# Patient Record
Sex: FEMALE | Race: OTHER | URBAN - METROPOLITAN AREA
[De-identification: names, ages, dates, MRNs, and addresses within clinical notes are randomized per-mention and may not be internally consistent; named-entity substitution may affect disease eponyms.]

---

## 2019-04-23 ENCOUNTER — OFFICE VISIT (OUTPATIENT)
Dept: FAMILY MEDICINE CLINIC | Age: 29
End: 2019-04-23

## 2019-04-23 VITALS
HEART RATE: 98 BPM | DIASTOLIC BLOOD PRESSURE: 82 MMHG | SYSTOLIC BLOOD PRESSURE: 118 MMHG | WEIGHT: 201.6 LBS | RESPIRATION RATE: 14 BRPM | HEIGHT: 65 IN | BODY MASS INDEX: 33.59 KG/M2 | OXYGEN SATURATION: 96 % | TEMPERATURE: 98 F

## 2019-04-23 DIAGNOSIS — Z32.01 PREGNANCY, CONFIRMED, NOT FIRST: Primary | ICD-10-CM

## 2019-04-23 DIAGNOSIS — Z32.01 PREGNANCY TEST POSITIVE: ICD-10-CM

## 2019-04-23 LAB
HCG URINE, QL. (POC): POSITIVE
VALID INTERNAL CONTROL?: YES

## 2019-04-23 NOTE — PROGRESS NOTES
Sal South is a 34 y.o.  female and presents with    Chief Complaint   Patient presents with    [facility]    Pregnancy Test     at home test is positive         Subjective:  Patient presents for pregnancy test. She is new to office and does not have GYN. No other c/o today. Current Outpatient Medications   Medication Sig Dispense Refill    prenatal 14/WPTU fum/folic/dha (PRENATAL-1 PO) Take  by mouth. No Known Allergies    Review of Systems   Genitourinary:        + missed period. Objective:  Vitals:    04/23/19 1116   BP: 118/82   Pulse: 98   Resp: 14   Temp: 98 °F (36.7 °C)   TempSrc: Oral   SpO2: 96%   Weight: 201 lb 9.6 oz (91.4 kg)   Height: 5' 4.72\" (1.644 m)   PainSc:   0 - No pain     General:   Well-groomed, well-nourished, in no distress, pleasant, alert, appropriate    Cardiovasc:   RRR,  no murmur, rubs or gallops. Pulmonary:    Lungs clear bilaterally, no wheezing, rales or rhonchi. Abdomen:   Abdomen soft, normal bowel sounds. No masses, tenderness,    rebound/rigidity or CVA tenderness. No hepatosplenomegaly. Assessment/Plan:      1. Pregnancy test positive  - AMB POC URINE PREGNANCY TEST, VISUAL COLOR COMPARISON    2. Pregnancy, confirmed, not first  Confirmed based on LMP 03/05/2019, Due date is 12/11/2019. Suggested [facility] she will call for appt. I have discussed the diagnosis with the patient and the intended plan as seen in the above orders. The patient has received an after-visit summary and questions were answered concerning future plans. I have discussed medication side effects and warnings with the patient as well. I have reviewed the plan of care with the patient, accepted their input and they are in agreement with the treatment goals. Follow-up and Dispositions    · Return in about 1 year (around 4/23/2020).        More than 1/2 of this 30 minute visit was spent in counselling and coordination of care, as described above.     Mendoza Veras FNP-BC

## 2019-04-23 NOTE — PATIENT INSTRUCTIONS
Due date 12/11/2019  6 weeks 6 days now        Learning About Pregnancy  Your Care Instructions    Your health in the early weeks of your pregnancy is particularly important for your baby's health. Take good care of yourself. Anything you do that harms your body can also harm your baby. Make sure to go to all of your doctor appointments. Regular checkups will help keep you and your baby healthy. How can you care for yourself at home? Diet    · Eat a balanced diet. Make sure your diet includes plenty of beans, peas, and leafy green vegetables.     · Do not skip meals or go for many hours without eating. If you are nauseated, try to eat a small, healthy snack every 2 to 3 hours.     · Do not eat fish that has a high level of mercury, such as shark, swordfish, or mackerel. Do not eat more than one can of tuna each week.     · Drink plenty of fluids, enough so that your urine is light yellow or clear like water. If you have kidney, heart, or liver disease and have to limit fluids, talk with your doctor before you increase the amount of fluids you drink.     · Cut down on caffeine, such as coffee, tea, and cola.     · Do not drink alcohol, such as beer, wine, or hard liquor.     · Take a multivitamin that contains at least 400 micrograms (mcg) of folic acid to help prevent birth defects. Fortified cereal and whole wheat bread are good additional sources of folic acid.     · Increase the calcium in your diet. Try to drink a quart of skim milk each day. You may also take calcium supplements and choose foods such as cheese and yogurt.    Lifestyle    · Make sure you go to your follow-up appointments.     · Get plenty of rest. You may be unusually tired while you are pregnant.     · Get at least 30 minutes of exercise on most days of the week. Walking is a good choice. If you have not exercised in the past, start out slowly. Take several short walks each day.     · Do not smoke.  If you need help quitting, talk to your doctor about stop-smoking programs. These can increase your chances of quitting for good.     · Do not touch cat feces or litter boxes. Also, wash your hands after you handle raw meat, and fully cook all meat before you eat it. Wear gloves when you work in the yard or garden, and wash your hands well when you are done. Cat feces, raw or undercooked meat, and contaminated dirt can cause an infection that may harm your baby or lead to a miscarriage.     · Do not use saunas or hot tubs. Raising your body temperature may harm your baby.     · Avoid chemical fumes, paint fumes, or poisons.     · Do not use illegal drugs or alcohol. Medicines    · Review all of your medicines with your doctor. Some of your routine medicines may need to be changed to protect your baby.     · Use acetaminophen (Tylenol) to relieve minor problems, such as a mild headache or backache or a mild fever with cold symptoms. Do not use nonsteroidal anti-inflammatory drugs (NSAIDs), such as ibuprofen (Advil, Motrin) or naproxen (Aleve), unless your doctor says it is okay.     · Do not take two or more pain medicines at the same time unless the doctor told you to. Many pain medicines have acetaminophen, which is Tylenol. Too much acetaminophen (Tylenol) can be harmful.     · Take your medicines exactly as prescribed. Call your doctor if you think you are having a problem with your medicine.    To manage morning sickness    · If you feel sick when you first wake up, try eating a small snack (such as crackers) before you get out of bed. Allow some time to digest the snack, and then get out of bed slowly.     · Do not skip meals or go for long periods without eating. An empty stomach can make nausea worse.     · Eat small, frequent meals instead of three large meals each day.     · Drink plenty of fluids.  Sports drinks, such as Gatorade or Powerade, are good choices.     · Eat foods that are high in protein but low in fat.     · If you are taking iron supplements, ask your doctor if they are necessary. Iron can make nausea worse.     · Avoid any smells, such as coffee, that make you feel sick.     · Get lots of rest. Morning sickness may be worse when you are tired. Follow-up care is a key part of your treatment and safety. Be sure to make and go to all appointments, and call your doctor if you are having problems. It's also a good idea to know your test results and keep a list of the medicines you take. Where can you learn more? Go to http://ramya-leticia.info/. Enter G722 in the search box to learn more about \"Learning About Pregnancy. \"  Current as of: September 5, 2018  Content Version: 11.9  © 4965-5426 [employer]. Care instructions adapted under license by [employer] (which disclaims liability or warranty for this information). If you have questions about a medical condition or this instruction, always ask your healthcare professional. [address] any warranty or liability for your use of this information.

## 2019-04-23 NOTE — PROGRESS NOTES
Coco Galvez is a 34 y.o. female (: 1990) presenting to address:    Chief Complaint   Patient presents with   [facility] Pregnancy Test     at home test is positive       Vitals:    19 1116   BP: 118/82   Pulse: 98   Resp: 14   Temp: 98 °F (36.7 °C)   TempSrc: Oral   SpO2: 96%   Weight: 201 lb 9.6 oz (91.4 kg)   Height: 5' 4.72\" (1.644 m)   PainSc:   0 - No pain       Hearing/Vision:   No exam data present    Learning Assessment:     Learning Assessment 2019   PRIMARY LEARNER Patient   HIGHEST LEVEL OF EDUCATION - PRIMARY LEARNER  GRADUATED HIGH SCHOOL OR GED   BARRIERS PRIMARY LEARNER NONE   CO-LEARNER CAREGIVER No   PRIMARY LANGUAGE ENGLISH    NEED No   LEARNER PREFERENCE PRIMARY DEMONSTRATION   ANSWERED BY patient   RELATIONSHIP SELF     Depression Screening:     3 most recent PHQ Screens 2019   Little interest or pleasure in doing things Not at all     Fall Risk Assessment:   No flowsheet data found. Abuse Screening:   No flowsheet data found. Coordination of Care Questionaire:   1. Have you been to the ER, urgent care clinic since your last visit? Hospitalized since your last visit? YES    2. Have you seen or consulted any other health care providers outside of the [address] [place] [address] since your last visit? Include any pap smears or colon screening. NO    Advanced Directive:   1. Do you have an Advanced Directive? NO    2. Would you like information on Advanced Directives?  NO

## 2019-05-30 ENCOUNTER — OFFICE VISIT (OUTPATIENT)
Dept: FAMILY MEDICINE CLINIC | Age: 29
End: 2019-05-30

## 2019-05-30 VITALS
DIASTOLIC BLOOD PRESSURE: 74 MMHG | TEMPERATURE: 95.8 F | BODY MASS INDEX: 32.49 KG/M2 | RESPIRATION RATE: 18 BRPM | WEIGHT: 195 LBS | HEIGHT: 65 IN | HEART RATE: 91 BPM | SYSTOLIC BLOOD PRESSURE: 113 MMHG | OXYGEN SATURATION: 95 %

## 2019-05-30 DIAGNOSIS — Z00.00 ROUTINE GENERAL MEDICAL EXAMINATION AT A HEALTH CARE FACILITY: ICD-10-CM

## 2019-05-30 DIAGNOSIS — E11.9 TYPE 2 DIABETES MELLITUS WITHOUT COMPLICATION, WITHOUT LONG-TERM CURRENT USE OF INSULIN (HCC): Primary | ICD-10-CM

## 2019-05-30 LAB — HBA1C MFR BLD HPLC: 10.9 %

## 2019-05-30 RX ORDER — BISMUTH SUBSALICYLATE 262 MG
1 TABLET,CHEWABLE ORAL DAILY
COMMUNITY

## 2019-05-30 RX ORDER — METFORMIN HYDROCHLORIDE 500 MG/1
500 TABLET ORAL 2 TIMES DAILY WITH MEALS
Qty: 180 TAB | Refills: 0 | Status: SHIPPED | OUTPATIENT
Start: 2019-05-30 | End: 2019-08-20 | Stop reason: SDUPTHER

## 2019-05-30 RX ORDER — INSULIN PUMP SYRINGE, 3 ML
EACH MISCELLANEOUS
Qty: 1 KIT | Refills: 0 | Status: SHIPPED | OUTPATIENT
Start: 2019-05-30

## 2019-05-30 NOTE — PROGRESS NOTES
Subjective:     Shane Pulido is a 34 y.o. female seen for follow up of diabetes. She also has obesity. Diabetic Review of Systems - diabetic diet compliance: Just started watching CARBS with new dx, home glucose monitoring: has never had to test before. Other symptoms and concerns: none. Current Outpatient Medications   Medication Sig Dispense Refill    multivitamin (ONE A DAY) tablet Take 1 Tab by mouth daily.  metFORMIN (GLUCOPHAGE) 500 mg tablet Take 1 Tab by mouth two (2) times daily (with meals). 180 Tab 0    Blood-Glucose Meter monitoring kit Insurance choice dx E11.9 1 Kit 0    prenatal 39/CZCZ fum/folic/dha (PRENATAL-1 PO) Take  by mouth. No Known Allergies     Lab Results   Component Value Date/Time    Hemoglobin A1c (POC) 10.9 05/30/2019 12:45 PM         Review of Systems  Pertinent items are noted in HPI. Objective:     Visit Vitals  /74 (BP 1 Location: Right arm, BP Patient Position: Sitting)   Pulse 91   Temp 95.8 °F (35.4 °C) (Oral)   Resp 18   Ht 5' 4.72\" (1.644 m)   Wt 195 lb (88.5 kg)   SpO2 95%   BMI 32.73 kg/m²     Appearance: alert, well appearing, and in no distress. Exam: heart sounds normal rate, regular rhythm, normal S1, S2, no murmurs, rubs, clicks or gallops  Lab review: orders written for new lab studies as appropriate; see orders. Assessment/Plan:     diabetes poorly controlled. Diabetic issues reviewed with her: referral to Diabetic Education department, diabetic diet discussed in detail, written exchange diet given, low cholesterol diet, weight control and daily exercise discussed, home glucose monitoring emphasized, home glucose monitoring demonstrated and taught and all medications, side effects and compliance discussed carefully. See back in 3 months will call with lab results    Encounter Diagnoses   Name Primary?     Type 2 diabetes mellitus without complication, without long-term current use of insulin ([address] Yes    Routine general medical examination at a health care facility      Orders Placed This Encounter    CBC W/O DIFF    METABOLIC PANEL, BASIC    HEPATIC FUNCTION PANEL    LIPID PANEL    TSH 3RD GENERATION    T4, FREE    AMB POC HEMOGLOBIN A1C    multivitamin (ONE A DAY) tablet    metFORMIN (GLUCOPHAGE) 500 mg tablet    Blood-Glucose Meter monitoring kit

## 2019-05-30 NOTE — PATIENT INSTRUCTIONS
Learning About Diabetes Food Guidelines  Your Care Instructions    Meal planning is important to manage diabetes. It helps keep your blood sugar at a target level (which you set with your doctor). You don't have to eat special foods. You can eat what your family eats, including sweets once in a while. But you do have to pay attention to how often you eat and how much you eat of certain foods. You may want to work with a dietitian or a certified diabetes educator (CDE) to help you plan meals and snacks. A dietitian or CDE can also help you lose weight if that is one of your goals. What should you know about eating carbs? Managing the amount of carbohydrate (carbs) you eat is an important part of healthy meals when you have diabetes. Carbohydrate is found in many foods. · Learn which foods have carbs. And learn the amounts of carbs in different foods. ? Bread, cereal, pasta, and rice have about 15 grams of carbs in a serving. A serving is 1 slice of bread (1 ounce), ½ cup of cooked cereal, or 1/3 cup of cooked pasta or rice. ? Fruits have 15 grams of carbs in a serving. A serving is 1 small fresh fruit, such as an apple or orange; ½ of a banana; ½ cup of cooked or canned fruit; ½ cup of fruit juice; 1 cup of melon or raspberries; or 2 tablespoons of dried fruit. ? Milk and no-sugar-added yogurt have 15 grams of carbs in a serving. A serving is 1 cup of milk or 2/3 cup of no-sugar-added yogurt. ? Starchy vegetables have 15 grams of carbs in a serving. A serving is ½ cup of mashed potatoes or sweet potato; 1 cup winter squash; ½ of a small baked potato; ½ cup of cooked beans; or ½ cup cooked corn or green peas. · Learn how much carbs to eat each day and at each meal. A dietitian or CDE can teach you how to keep track of the amount of carbs you eat. This is called carbohydrate counting. · If you are not sure how to count carbohydrate grams, use the Plate Method to plan meals.  It is a good, quick way to make sure that you have a balanced meal. It also helps you spread carbs throughout the day. ? Divide your plate by types of foods. Put non-starchy vegetables on half the plate, meat or other protein food on one-quarter of the plate, and a grain or starchy vegetable in the final quarter of the plate. To this you can add a small piece of fruit and 1 cup of milk or yogurt, depending on how many carbs you are supposed to eat at a meal.  · Try to eat about the same amount of carbs at each meal. Do not \"save up\" your daily allowance of carbs to eat at one meal.  · Proteins have very little or no carbs per serving. Examples of proteins are beef, chicken, turkey, fish, eggs, tofu, cheese, cottage cheese, and peanut butter. A serving size of meat is 3 ounces, which is about the size of a deck of cards. Examples of meat substitute serving sizes (equal to 1 ounce of meat) are 1/4 cup of cottage cheese, 1 egg, 1 tablespoon of peanut butter, and ½ cup of tofu. How can you eat out and still eat healthy? · Learn to estimate the serving sizes of foods that have carbohydrate. If you measure food at home, it will be easier to estimate the amount in a serving of restaurant food. · If the meal you order has too much carbohydrate (such as potatoes, corn, or baked beans), ask to have a low-carbohydrate food instead. Ask for a salad or green vegetables. · If you use insulin, check your blood sugar before and after eating out to help you plan how much to eat in the future. · If you eat more carbohydrate at a meal than you had planned, take a walk or do other exercise. This will help lower your blood sugar. What else should you know? · Limit saturated fat, such as the fat from meat and dairy products. This is a healthy choice because people who have diabetes are at higher risk of heart disease. So choose lean cuts of meat and nonfat or low-fat dairy products.  Use olive or canola oil instead of butter or shortening when cooking. · Don't skip meals. Your blood sugar may drop too low if you skip meals and take insulin or certain medicines for diabetes. · Check with your doctor before you drink alcohol. Alcohol can cause your blood sugar to drop too low. Alcohol can also cause a bad reaction if you take certain diabetes medicines. Follow-up care is a key part of your treatment and safety. Be sure to make and go to all appointments, and call your doctor if you are having problems. It's also a good idea to know your test results and keep a list of the medicines you take. Where can you learn more? Go to http://ramya-leticia.info/. Enter H547 in the search box to learn more about \"Learning About Diabetes Food Guidelines. \"  Current as of: July 25, 2018  Content Version: 11.9  © 9656-6035 [employer]. Care instructions adapted under license by [employer] (which disclaims liability or warranty for this information). If you have questions about a medical condition or this instruction, always ask your healthcare professional. [place] [address] any warranty or liability for your use of this information. Counting Carbohydrates: Care Instructions  Your Care Instructions    You don't have to eat special foods when you have diabetes. You just have to be careful to eat healthy foods. Carbohydrates (carbs) raise blood sugar higher and quicker than any other nutrient. Carbs are found in desserts, breads and cereals, and fruit. They're also in starchy vegetables. These include potatoes, corn, and grains such as rice and pasta. Carbs are also in milk and yogurt. The more carbs you eat at one time, the higher your blood sugar will rise. Spreading carbs all through the day helps keep your blood sugar levels within your target range. Counting carbs is one of the best ways to keep your blood sugar under control.   If you use insulin, counting carbs helps you match the right amount of insulin to the number of grams of carbs in a meal. Then you can change your diet and insulin dose as needed. Testing your blood sugar several times a day can help you learn how carbs affect your blood sugar. A registered dietitian or certified diabetes educator can help you plan meals and snacks. Follow-up care is a key part of your treatment and safety. Be sure to make and go to all appointments, and call your doctor if you are having problems. It's also a good idea to know your test results and keep a list of the medicines you take. How can you care for yourself at home? Know your daily amount of carbohydrates  Your daily amount depends on several things, such as your weight, how active you are, which diabetes medicines you take, and what your goals are for your blood sugar levels. A registered dietitian or certified diabetes educator can help you plan how many carbs to include in each meal and snack. For most adults, a guideline for the daily amount of carbs is:  · 45 to 60 grams at each meal. That's about the same as 3 to 4 carbohydrate servings. · 15 to 20 grams at each snack. That's about the same as 1 carbohydrate serving. Count carbs  Counting carbs lets you know how much rapid-acting insulin to take before you eat. If you use an insulin pump, you get a constant rate of insulin during the day. So the pump must be programmed at meals. This gives you extra insulin to cover the rise in blood sugar after meals. If you take insulin:  · Learn your own insulin-to-carb ratio. You and your diabetes health professional will figure out the ratio. You can do this by testing your blood sugar after meals. For example, you may need a certain amount of insulin for every 15 grams of carbs. · Add up the carb grams in a meal. Then you can figure out how many units of insulin to take based on your insulin-to-carb ratio. · Exercise lowers blood sugar.  You can use less insulin than you would if you were not doing exercise. Keep in mind that timing matters. If you exercise within 1 hour after a meal, your body may need less insulin for that meal than it would if you exercised 3 hours after the meal. Test your blood sugar to find out how exercise affects your need for insulin. If you do or don't take insulin:  · Look at labels on packaged foods. This can tell you how many carbs are in a serving. You can also use guides from the American Diabetes Association. · Be aware of portions, or serving sizes. If a package has two servings and you eat the whole package, you need to double the number of grams of carbohydrate listed for one serving. · Protein, fat, and fiber do not raise blood sugar as much as carbs do. If you eat a lot of these nutrients in a meal, your blood sugar will rise more slowly than it would otherwise. Eat from all food groups  · Eat at least three meals a day. · Plan meals to include food from all the food groups. The food groups include grains, fruits, dairy, proteins, and vegetables. · Talk to your dietitian or diabetes educator about ways to add limited amounts of sweets into your meal plan. · If you drink alcohol, talk to your doctor. It may not be recommended when you are taking certain diabetes medicines. Where can you learn more? Go to http://ramya-leticia.info/. Enter G526 in the search box to learn more about \"Counting Carbohydrates: Care Instructions. \"  Current as of: July 25, 2018  Content Version: 11.9  © 3629-8685 [employer]. Care instructions adapted under license by [employer] (which disclaims liability or warranty for this information). If you have questions about a medical condition or this instruction, always ask your healthcare professional. [place] [address] any warranty or liability for your use of this information.

## 2019-05-30 NOTE — PROGRESS NOTES
Diogo Leyva is a 34 y.o. female (: 1990) presenting to address:    Chief Complaint   Patient presents with    Diabetes     patient stated ob/gyn stated she has diabetes       Vitals:    19 1153   BP: 113/74   Pulse: 91   Resp: 18   Temp: 95.8 °F (35.4 °C)   TempSrc: Oral   SpO2: 95%   Weight: 195 lb (88.5 kg)   Height: 5' 4.72\" (1.644 m)   PainSc:   0 - No pain       Hearing/Vision:   No exam data present    Learning Assessment:     Learning Assessment 2019   PRIMARY LEARNER Patient   HIGHEST LEVEL OF EDUCATION - PRIMARY LEARNER  GRADUATED HIGH SCHOOL OR GED   BARRIERS PRIMARY LEARNER NONE   CO-LEARNER CAREGIVER No   PRIMARY LANGUAGE ENGLISH    NEED No   LEARNER PREFERENCE PRIMARY DEMONSTRATION   ANSWERED BY patient   RELATIONSHIP SELF     Depression Screening:     3 most recent PHQ Screens 2019   Little interest or pleasure in doing things Not at all   Feeling down, depressed, irritable, or hopeless Not at all   Total Score PHQ 2 0     Fall Risk Assessment:   No flowsheet data found. Abuse Screening:   No flowsheet data found. Coordination of Care Questionaire:   1. Have you been to the ER, urgent care clinic since your last visit? Hospitalized since your last visit? NO    2. Have you seen or consulted any other health care providers outside of the [address] [place] [address] since your last visit? Include any pap smears or colon screening. YES [facility] physician for woman    Advanced Directive:   1. Do you have an Advanced Directive? NO    2. Would you like information on Advanced Directives?  NO

## 2019-06-04 RX ORDER — LANCETS
EACH MISCELLANEOUS
Qty: 100 EACH | Refills: 2 | Status: SHIPPED | OUTPATIENT
Start: 2019-06-04

## 2019-08-20 RX ORDER — METFORMIN HYDROCHLORIDE 500 MG/1
TABLET ORAL
Qty: 180 TAB | Refills: 0 | Status: SHIPPED | OUTPATIENT
Start: 2019-08-20 | End: 2021-02-03 | Stop reason: DRUGHIGH

## 2021-02-03 ENCOUNTER — OFFICE VISIT (OUTPATIENT)
Dept: FAMILY MEDICINE CLINIC | Age: 31
End: 2021-02-03

## 2021-02-03 ENCOUNTER — APPOINTMENT (OUTPATIENT)
Dept: FAMILY MEDICINE CLINIC | Age: 31
End: 2021-02-03

## 2021-02-03 VITALS — HEIGHT: 65 IN | TEMPERATURE: 98 F | WEIGHT: 197.6 LBS | BODY MASS INDEX: 32.92 KG/M2

## 2021-02-03 DIAGNOSIS — Z00.00 ANNUAL PHYSICAL EXAM: Primary | ICD-10-CM

## 2021-02-03 DIAGNOSIS — Z12.4 SCREENING FOR MALIGNANT NEOPLASM OF THE CERVIX: ICD-10-CM

## 2021-02-03 DIAGNOSIS — E11.9 TYPE 2 DIABETES MELLITUS WITHOUT COMPLICATION, WITHOUT LONG-TERM CURRENT USE OF INSULIN (HCC): ICD-10-CM

## 2021-02-03 DIAGNOSIS — E66.9 OBESITY (BMI 30.0-34.9): ICD-10-CM

## 2021-02-03 PROCEDURE — 99395 PREV VISIT EST AGE 18-39: CPT | Performed by: LEGAL MEDICINE

## 2021-02-03 NOTE — PROGRESS NOTES
Julia Miller     Chief Complaint   Patient presents with    Complete Physical     Vitals:    02/03/21 1358   Temp: 98 °F (36.7 °C)   TempSrc: Temporal   Weight: 197 lb 9.6 oz (89.6 kg)   Height: 5' 4.72\" (1.644 m)   PainSc:   0 - No pain   LMP: 01/07/2021         HPI: Patient is here for annual physical examination    She does not smoke does not drink alcohol no illicit drugs    Patient does have diabetes that is not controlled she is currently only on Metformin    She was on insulin during pregnancy last year I do not have value for recent hemoglobin A1c patient was referred to dietitian for diabetic education        Past Medical History:   Diagnosis Date    GERD (gastroesophageal reflux disease)     Type 2 diabetes mellitus without complication, without long-term current use of insulin ([facility] [address] 5/30/2019     No past surgical history on file. Social History     Tobacco Use    Smoking status: Never Smoker    Smokeless tobacco: Never Used   Substance Use Topics    Alcohol use: Not Currently     Comment: patient may be pregnant       No family history on file. Review of Systems   Constitutional: Negative for chills, fever, malaise/fatigue and weight loss. HENT: Negative for congestion, ear discharge, ear pain, hearing loss, nosebleeds, sinus pain and sore throat. Eyes: Negative for blurred vision, double vision and discharge. Respiratory: Negative for cough, hemoptysis, sputum production, shortness of breath, wheezing and stridor. Cardiovascular: Negative for chest pain, palpitations, claudication and leg swelling. Gastrointestinal: Negative for abdominal pain, constipation, diarrhea, nausea and vomiting. Genitourinary: Negative for dysuria, frequency and urgency. Musculoskeletal: Positive for back pain, joint pain and myalgias. Negative for falls and neck pain. Skin: Negative for itching and rash.    Neurological: Negative for dizziness, tingling, sensory change, speech change, focal weakness, seizures, weakness and headaches. Psychiatric/Behavioral: Negative for depression, hallucinations, substance abuse and suicidal ideas. The patient is not nervous/anxious and does not have insomnia. Physical Exam  Vitals signs and nursing note reviewed. Constitutional:       General: She is not in acute distress. Appearance: She is well-developed. She is not diaphoretic. HENT:      Head: Normocephalic and atraumatic. Mouth/Throat:      Pharynx: No oropharyngeal exudate. Eyes:      General: No scleral icterus. Right eye: No discharge. Left eye: No discharge. Conjunctiva/sclera: Conjunctivae normal.      Pupils: Pupils are equal, round, and reactive to light. Neck:      Thyroid: No thyromegaly. Cardiovascular:      Rate and Rhythm: Normal rate and regular rhythm. Heart sounds: Normal heart sounds. Pulmonary:      Effort: Pulmonary effort is normal. No respiratory distress. Breath sounds: Normal breath sounds. No wheezing or rales. Chest:      Chest wall: No tenderness. Abdominal:      General: There is no distension. Palpations: Abdomen is soft. Tenderness: There is no abdominal tenderness. There is no rebound. Musculoskeletal: Normal range of motion. General: No tenderness or deformity. Lymphadenopathy:      Cervical: No cervical adenopathy. Skin:     General: Skin is warm and dry. Coloration: Skin is not pale. Findings: No erythema or rash. Neurological:      Mental Status: She is alert and oriented to person, place, and time. Cranial Nerves: No cranial nerve deficit. Coordination: Coordination normal.   Psychiatric:         Behavior: Behavior normal.         Thought Content: Thought content normal.         Judgment: Judgment normal.          Assessment and plan     Plan of care has been discussed with the patient, he agrees to the plan and verbalized understanding.   All his questions were answered  More than 50% of the time spent in this visit was counseling the patient about  illness and treatment options         Breasts: breasts appear normal, no suspicious masses, no skin or nipple changes or axillary nodes. .Pap Smear Procedure     After obtaining verbal consent . and patient was placed in the lithectomy position   VULVA: normal appearing vulva with no masses, tenderness or lesions, VAGINA: normal appearing vagina with normal color and discharge, no lesions, CERVIX: normal    Pap smear sample  was obtained using silicon broom . appearing cervix without discharge or lesions, UTERUS: uterus is normal size, shape, consistency and nontender, ADNEXA: normal adnexa in size, nontender and no masses. Patient tolerated procedure well     1. Type 2 diabetes mellitus without complication, without long-term current use of insulin (HCC)    - HEMOGLOBIN A1C W/O EAG; Future  - TSH 3RD GENERATION; Future  - REFERRAL TO PHYSICAL THERAPY    2. Annual physical exam    - CBC WITH AUTOMATED DIFF; Future  - METABOLIC PANEL, COMPREHENSIVE; Future  - LIPID PANEL; Future  - HEMOGLOBIN A1C W/O EAG; Future  - TSH 3RD GENERATION; Future    3. Screening for malignant neoplasm of the cervix    - PAP IG, APTIMA HPV AND RFX 16/18,45 ([id number]); Future  - PAP IG, APTIMA HPV AND RFX 16/18,45 ([id number])    4. Obesity (BMI 30.0-34.9)    - REFERRAL TO PHYSICAL THERAPY    Current Outpatient Medications   Medication Sig Dispense Refill    lancets misc Use once lancet each time for  Once daily glucose testing. Insurance choice Dx 11.9 100 Each 2    glucose blood VI test strips (FREESTYLE LITE STRIPS) strip Use one test strip each time for once daily glucose testing. Dx E 11.9  Insurance choice 100 Strip 2    Blood-Glucose Meter monitoring kit Insurance choice dx E11.9 1 Kit 0    multivitamin (ONE A DAY) tablet Take 1 Tab by mouth daily.          Patient Active Problem List    Diagnosis Date Noted    Type 2 diabetes mellitus without complication, without long-term current use of insulin ([facility] [address] 05/30/2019     Results for orders placed or performed in visit on 05/30/19   AMB POC HEMOGLOBIN A1C   Result Value Ref Range    Hemoglobin A1c (POC) 10.9 %     No visits with results within 3 Month(s) from this visit.    Latest known visit with results is:   Office Visit on 05/30/2019   Component Date Value Ref Range Status    Hemoglobin A1c (POC) 05/30/2019 10.9  % Final

## 2021-02-03 NOTE — PROGRESS NOTES
Deysi Santos is a 27 y.o. female (: 1990) presenting to address:    Chief Complaint   Patient presents with    Complete Physical       Vitals:    21 1358   Temp: 98 °F (36.7 °C)   TempSrc: Temporal   Weight: 197 lb 9.6 oz (89.6 kg)   Height: 5' 4.72\" (1.644 m)   PainSc:   0 - No pain   LMP: 2021       Is someone accompanying this pt? NO    Is the patient using any DME equipment during OV? NO    Hearing/Vision:   No exam data present    Learning Assessment:     Learning Assessment 2019   PRIMARY LEARNER Patient   HIGHEST LEVEL OF EDUCATION - PRIMARY LEARNER  GRADUATED HIGH SCHOOL OR GED   BARRIERS PRIMARY LEARNER NONE   CO-LEARNER CAREGIVER No   PRIMARY LANGUAGE ENGLISH    NEED No   LEARNER PREFERENCE PRIMARY DEMONSTRATION   ANSWERED BY patient   RELATIONSHIP SELF     Depression Screening:     3 most recent PHQ Screens 2/3/2021   Little interest or pleasure in doing things Not at all   Feeling down, depressed, irritable, or hopeless Not at all   Total Score PHQ 2 0     Fall Risk Assessment:   No flowsheet data found. Coordination of Care Questionaire:   1. Have you been to the ER, urgent care clinic since your last visit? Hospitalized since your last visit? YES [facility] for [facility]     2. Have you seen or consulted any other health care providers outside of the [address] [place] [address] since your last visit? Include any pap smears or colon screening. NO    Advanced Directive:   1. Do you have an Advanced Directive? NO    2. Would you like information on Advanced Directives?  NO

## 2021-02-04 LAB
ALBUMIN SERPL-MCNC: 4.1 G/DL (ref 3.9–5)
ALBUMIN/GLOB SERPL: 1.2 {RATIO} (ref 1.2–2.2)
ALP SERPL-CCNC: 96 IU/L (ref 39–117)
ALT SERPL-CCNC: 41 IU/L (ref 0–32)
AST SERPL-CCNC: 30 IU/L (ref 0–40)
BASOPHILS # BLD AUTO: 0.1 X10E3/UL (ref 0–0.2)
BASOPHILS NFR BLD AUTO: 1 %
BILIRUB SERPL-MCNC: 0.3 MG/DL (ref 0–1.2)
BUN SERPL-MCNC: 9 MG/DL (ref 6–20)
BUN/CREAT SERPL: 15 (ref 9–23)
CALCIUM SERPL-MCNC: 9.5 MG/DL (ref 8.7–10.2)
CHLORIDE SERPL-SCNC: 96 MMOL/L (ref 96–106)
CHOLEST SERPL-MCNC: 244 MG/DL (ref 100–199)
CO2 SERPL-SCNC: 18 MMOL/L (ref 20–29)
CREAT SERPL-MCNC: 0.59 MG/DL (ref 0.57–1)
EOSINOPHIL # BLD AUTO: 0.1 X10E3/UL (ref 0–0.4)
EOSINOPHIL NFR BLD AUTO: 1 %
ERYTHROCYTE [DISTWIDTH] IN BLOOD BY AUTOMATED COUNT: 13 % (ref 11.7–15.4)
GLOBULIN SER CALC-MCNC: 3.5 G/DL (ref 1.5–4.5)
GLUCOSE SERPL-MCNC: 338 MG/DL (ref 65–99)
HBA1C MFR BLD: 10.8 % (ref 4.8–5.6)
HCT VFR BLD AUTO: 41.4 % (ref 34–46.6)
HDLC SERPL-MCNC: 27 MG/DL
HGB BLD-MCNC: 14.3 G/DL (ref 11.1–15.9)
IMM GRANULOCYTES # BLD AUTO: 0 X10E3/UL (ref 0–0.1)
IMM GRANULOCYTES NFR BLD AUTO: 1 %
INTERPRETATION, 910389: NORMAL
LDLC SERPL CALC-MCNC: ABNORMAL MG/DL (ref 0–99)
LYMPHOCYTES # BLD AUTO: 2.2 X10E3/UL (ref 0.7–3.1)
LYMPHOCYTES NFR BLD AUTO: 28 %
Lab: NORMAL
MCH RBC QN AUTO: 29 PG (ref 26.6–33)
MCHC RBC AUTO-ENTMCNC: 34.5 G/DL (ref 31.5–35.7)
MCV RBC AUTO: 84 FL (ref 79–97)
MONOCYTES # BLD AUTO: 0.4 X10E3/UL (ref 0.1–0.9)
MONOCYTES NFR BLD AUTO: 5 %
NEUTROPHILS # BLD AUTO: 5.1 X10E3/UL (ref 1.4–7)
NEUTROPHILS NFR BLD AUTO: 64 %
PLATELET # BLD AUTO: 277 X10E3/UL (ref 150–450)
POTASSIUM SERPL-SCNC: 4.3 MMOL/L (ref 3.5–5.2)
PROT SERPL-MCNC: 7.6 G/DL (ref 6–8.5)
RBC # BLD AUTO: 4.93 X10E6/UL (ref 3.77–5.28)
SODIUM SERPL-SCNC: 134 MMOL/L (ref 134–144)
TRIGL SERPL-MCNC: 1183 MG/DL (ref 0–149)
TSH SERPL DL<=0.005 MIU/L-ACNC: 1.53 UIU/ML (ref 0.45–4.5)
VLDLC SERPL CALC-MCNC: ABNORMAL MG/DL (ref 5–40)
WBC # BLD AUTO: 7.8 X10E3/UL (ref 3.4–10.8)

## 2021-02-10 ENCOUNTER — VIRTUAL VISIT (OUTPATIENT)
Dept: FAMILY MEDICINE CLINIC | Age: 31
End: 2021-02-10
Payer: COMMERCIAL

## 2021-02-10 DIAGNOSIS — E78.1 HYPERTRIGLYCERIDEMIA: ICD-10-CM

## 2021-02-10 DIAGNOSIS — E11.9 TYPE 2 DIABETES MELLITUS WITHOUT COMPLICATION, WITHOUT LONG-TERM CURRENT USE OF INSULIN (HCC): Primary | ICD-10-CM

## 2021-02-10 LAB
CYTOLOGIST CVX/VAG CYTO: NORMAL
CYTOLOGY CVX/VAG DOC CYTO: NORMAL
CYTOLOGY CVX/VAG DOC THIN PREP: NORMAL
DX ICD CODE: NORMAL
HPV I/H RISK 4 DNA CVX QL PROBE+SIG AMP: NEGATIVE
Lab: NORMAL
OTHER STN SPEC: NORMAL
STAT OF ADQ CVX/VAG CYTO-IMP: NORMAL

## 2021-02-10 PROCEDURE — 99442 PR PHYS/QHP TELEPHONE EVALUATION 11-20 MIN: CPT | Performed by: LEGAL MEDICINE

## 2021-02-10 RX ORDER — ATORVASTATIN CALCIUM 20 MG/1
20 TABLET, FILM COATED ORAL DAILY
Qty: 90 TAB | Refills: 0 | Status: SHIPPED | OUTPATIENT
Start: 2021-02-10 | End: 2021-05-06

## 2021-02-10 RX ORDER — METFORMIN HYDROCHLORIDE 1000 MG/1
2000 TABLET, FILM COATED, EXTENDED RELEASE ORAL DAILY
Qty: 180000 MG | Refills: 0 | Status: SHIPPED | OUTPATIENT
Start: 2021-02-10 | End: 2021-02-10 | Stop reason: CLARIF

## 2021-02-10 RX ORDER — METFORMIN HYDROCHLORIDE 1000 MG/1
1000 TABLET, FILM COATED, EXTENDED RELEASE ORAL DAILY
Qty: 90000 MG | Refills: 0 | Status: SHIPPED | OUTPATIENT
Start: 2021-02-10 | End: 2021-02-10 | Stop reason: DRUGHIGH

## 2021-02-10 RX ORDER — METFORMIN HYDROCHLORIDE 1000 MG/1
1000 TABLET ORAL 2 TIMES DAILY WITH MEALS
Qty: 180 TAB | Refills: 1 | Status: SHIPPED | OUTPATIENT
Start: 2021-02-10 | End: 2022-06-06

## 2021-02-10 NOTE — PROGRESS NOTES
Cynthia Hoskins is a 32 y.o. female, evaluated via audio-only technology on 2/10/2021 for High Blood Sugar  . Patient is here for follow-up on elevated blood sugar hemoglobin A1c is  10.8, patient has a very elevated triglyceride it is 1183    She has no acute complain, she is not checking blood sugar regularly and trying with lifestyle modifications    Assessment & Plan:   Diagnoses and all orders for this visit:    1. Type 2 diabetes mellitus without complication, without long-term current use of insulin ([facility])    Patient will take Metformin 1000 mg twice a day      And will add Jardiance 10 mg daily    Advised patient to check fasting blood sugar daily and to be adherent to lifestyle modification and she will be scheduled to see a nutritionist for diabetic education    Reevaluate   -     empagliflozin (Jardiance) 10 mg tablet; Take 1 Tab by mouth daily for 30 days. -     atorvastatin (LIPITOR) 20 mg tablet; Take 1 Tab by mouth daily for 90 days. -     metFORMIN (GLUCOPHAGE) 1,000 mg tablet; Take 1 Tab by mouth two (2) times daily (with meals) for 90 days. 2. Hypertriglyceridemia    Elevated triglycerides most likely secondary to elevated blood sugar patient need to start on Lipitor 20 mg daily    Reevaluate in 3 months  -     atorvastatin (LIPITOR) 20 mg tablet; Take 1 Tab by mouth daily for 90 days. 12  Subjective:       Prior to Admission medications    Medication Sig Start Date End Date Taking? Authorizing Provider   empagliflozin (Jardiance) 10 mg tablet Take 1 Tab by mouth daily for 30 days. 2/10/21 3/12/21 Yes Kalina Hernandez MD   atorvastatin (LIPITOR) 20 mg tablet Take 1 Tab by mouth daily for 90 days. 2/10/21 5/11/21 Yes Kalina Hernandez MD   metFORMIN (GLUCOPHAGE) 1,000 mg tablet Take 1 Tab by mouth two (2) times daily (with meals) for 90 days. 2/10/21 5/11/21 Yes Kalina Hernandez MD   lancets misc Use once lancet each time for  Once daily glucose testing.   Insurance choice Dx 11.9 6/4/19   Amanda MADISON Rodriguez   glucose blood VI test strips (FREESTYLE LITE STRIPS) strip Use one test strip each time for once daily glucose testing. Dx E 11.9  Insurance choice 6/4/19   Amanda Michael NP   multivitamin (ONE A DAY) tablet Take 1 Tab by mouth daily. Provider, Historical   Blood-Glucose Meter monitoring kit Insurance choice dx E11.9 5/30/19   Amanda Rodriguez NP     Patient Active Problem List   Diagnosis Code    Type 2 diabetes mellitus without complication, without long-term current use of insulin ([facility] [address] E11.9     Patient Active Problem List    Diagnosis Date Noted    Type 2 diabetes mellitus without complication, without long-term current use of insulin ([facility] [address] 05/30/2019     Current Outpatient Medications   Medication Sig Dispense Refill    empagliflozin (Jardiance) 10 mg tablet Take 1 Tab by mouth daily for 30 days. 30 Tab 1    atorvastatin (LIPITOR) 20 mg tablet Take 1 Tab by mouth daily for 90 days. 90 Tab 0    metFORMIN (GLUCOPHAGE) 1,000 mg tablet Take 1 Tab by mouth two (2) times daily (with meals) for 90 days. 180 Tab 1    lancets misc Use once lancet each time for  Once daily glucose testing. Insurance choice Dx 11.9 100 Each 2    glucose blood VI test strips (FREESTYLE LITE STRIPS) strip Use one test strip each time for once daily glucose testing. Dx E 11.9  Insurance choice 100 Strip 2    multivitamin (ONE A DAY) tablet Take 1 Tab by mouth daily.  Blood-Glucose Meter monitoring kit Insurance choice dx E11.9 1 Kit 0     No Known Allergies  Past Medical History:   Diagnosis Date    GERD (gastroesophageal reflux disease)     Type 2 diabetes mellitus without complication, without long-term current use of insulin ([facility] [address] 5/30/2019     No past surgical history on file. No family history on file.   Social History     Tobacco Use    Smoking status: Never Smoker    Smokeless tobacco: Never Used   Substance Use Topics    Alcohol use: Not Currently     Comment: patient may be pregnant       Review of Systems   Constitutional: Negative for chills, fever, malaise/fatigue and weight loss. HENT: Negative for congestion, ear discharge, ear pain, hearing loss, nosebleeds, sinus pain and sore throat. Eyes: Negative for blurred vision, double vision and discharge. Respiratory: Negative for cough and stridor. Cardiovascular: Negative for chest pain, palpitations, claudication and leg swelling. Gastrointestinal: Negative for abdominal pain, blood in stool, constipation, diarrhea, melena, nausea and vomiting. Genitourinary: Negative for dysuria, frequency and urgency. Musculoskeletal: Negative for myalgias. Skin: Negative for itching and rash. Neurological: Negative for dizziness, tingling, sensory change, speech change, focal weakness, weakness and headaches. No flowsheet data found. Dylan Duval, who was evaluated through a patient-initiated, synchronous (real-time) audio only encounter, and/or her healthcare decision maker, is aware that it is a billable service, with coverage as determined by her insurance carrier. She provided verbal consent to proceed: Yes. She has not had a related appointment within my department in the past 7 days or scheduled within the next 24 hours.       Total Time: 13 minutes     Patricia Dover MD

## 2021-02-10 NOTE — LETTER
2/10/2021 Ms. Faheem Soto [place] [id number] Dear Faheem Soto: 
 
Please find your most recent results below. Resulted Orders PAP IG, APTIMA HPV AND RFX 16/18,45 ([id number]) Result Value Ref Range Diagnosis Comment Specimen adequacy Comment Clinician provided ICD10 Comment Performed by: Comment German Porter Ra Note: Comment Test methodology Comment HPV APTIMA Negative Negative Narrative Specimen Comment: Source. ........... German Corrales Cervix Specimen Comment: No. of containers. German Corrales 01 ThinPrep Vial 
Performed at:  [address]  [id number] : Donald De Anda MD, Phone:  [phone] Performed at:  [address] [facility] [address] 
[address]  [id number] : Donald De Anda MD, Phone:  [phone] CBC WITH AUTOMATED DIFF Result Value Ref Range WBC 7.8 3.4 - 10.8 x10E3/uL  
 RBC 4.93 3.77 - 5.28 x10E6/uL HGB 14.3 11.1 - 15.9 g/dL HCT 41.4 34.0 - 46.6 % MCV 84 79 - 97 fL  
 MCH 29.0 26.6 - 33.0 pg  
 MCHC 34.5 31.5 - 35.7 g/dL  
 RDW 13.0 11.7 - 15.4 % PLATELET 500 183 - 689 x10E3/uL NEUTROPHILS 64 Not Estab. % Lymphocytes 28 Not Estab. % MONOCYTES 5 Not Estab. % EOSINOPHILS 1 Not Estab. % BASOPHILS 1 Not Estab. %  
 ABS. NEUTROPHILS 5.1 1.4 - 7.0 x10E3/uL Abs Lymphocytes 2.2 0.7 - 3.1 x10E3/uL  
 ABS. MONOCYTES 0.4 0.1 - 0.9 x10E3/uL  
 ABS. EOSINOPHILS 0.1 0.0 - 0.4 x10E3/uL  
 ABS. BASOPHILS 0.1 0.0 - 0.2 x10E3/uL IMMATURE GRANULOCYTES 1 Not Estab. %  
 ABS. IMM. GRANS. 0.0 0.0 - 0.1 x10E3/uL Narrative Performed at:  [address]  [id number] : Donald De Anda MD, Phone:  [phone] METABOLIC PANEL, COMPREHENSIVE Result Value Ref Range Glucose 338 (H) 65 - 99 mg/dL BUN 9 6 - 20 mg/dL Creatinine 0.59 0.57 - 1.00 mg/dL  GFR est non- >59 mL/min/1.73  
 GFR est  >59 mL/min/1.73  
 BUN/Creatinine ratio 15 9 - 23 Sodium 134 134 - 144 mmol/L Potassium 4.3 3.5 - 5.2 mmol/L Chloride 96 96 - 106 mmol/L  
 CO2 18 (L) 20 - 29 mmol/L Calcium 9.5 8.7 - 10.2 mg/dL Protein, total 7.6 6.0 - 8.5 g/dL Albumin 4.1 3.9 - 5.0 g/dL GLOBULIN, TOTAL 3.5 1.5 - 4.5 g/dL A-G Ratio 1.2 1.2 - 2.2 Bilirubin, total 0.3 0.0 - 1.2 mg/dL Alk. phosphatase 96 39 - 117 IU/L  
 AST (SGOT) 30 0 - 40 IU/L  
 ALT (SGPT) 41 (H) 0 - 32 IU/L Narrative Performed at:  [address]  [id number] : Slade Shahid MD, Phone:  [phone] LIPID PANEL Result Value Ref Range Cholesterol, total 244 (H) 100 - 199 mg/dL Triglyceride 1,183 (HH) 0 - 149 mg/dL HDL Cholesterol 27 (L) >39 mg/dL VLDL, calculated Comment (A) 5 - 40 mg/dL LDL, calculated Comment (A) 0 - 99 mg/dL Narrative Performed at:  [address]  [id number] : Slade Shahid MD, Phone:  [phone] CVD REPORT Result Value Ref Range INTERPRETATION Note Narrative Performed at:  [address]  [id number] : Jose Teran MD, Phone:  [phone] DIABETES PATIENT EDUCATION Result Value Ref Range PDF Image Not applicable Narrative Performed at:  [address]  [id number] : Jose Teran MD, Phone:  [phone] TSH 3RD GENERATION Result Value Ref Range TSH 1.530 0.450 - 4.500 uIU/mL Narrative Performed at:  [address]  [id number] : Slade Shahid MD, Phone:  [phone] HEMOGLOBIN A1C W/O EAG Result Value Ref Range Hemoglobin A1c 10.8 (H) 4.8 - 5.6 % Narrative Performed at:  [address]  [id number] : Tony Barron MD, Phone:  [phone] RECOMMENDATIONS: 
 
Normal pap negative HPV repeat in 3 years Please call me if you have any questions: [phone] Sincerely, Samuel Chang

## 2021-02-10 NOTE — LETTER
2/10/2021 Ms. Rei Truong [place] [phone] Dear Rei Truong: 
 
Please find your most recent results below. Resulted Orders PAP IG, APTIMA HPV AND RFX 16/18,45 ([id number]) Result Value Ref Range Diagnosis Comment Specimen adequacy Comment Clinician provided ICD10 Comment Performed by: Comment Thea Drew Numecentbailee Billing Note: Comment Test methodology Comment HPV APTIMA Negative Negative Narrative Specimen Comment: Source. ........... Apjudithbailee Billing Cervix Specimen Comment: No. of containers. Apjudithbailee Billing 01 ThinPrep Vial 
Performed at:  [address]  [id number] : Quyen Dawn MD, Phone:  [phone] Performed at:  [address] [facility] [address] 
[address]  [id number] : Quyen Dawn MD, Phone:  [phone] CBC WITH AUTOMATED DIFF Result Value Ref Range WBC 7.8 3.4 - 10.8 x10E3/uL  
 RBC 4.93 3.77 - 5.28 x10E6/uL HGB 14.3 11.1 - 15.9 g/dL HCT 41.4 34.0 - 46.6 % MCV 84 79 - 97 fL  
 MCH 29.0 26.6 - 33.0 pg  
 MCHC 34.5 31.5 - 35.7 g/dL  
 RDW 13.0 11.7 - 15.4 % PLATELET 760 711 - 025 x10E3/uL NEUTROPHILS 64 Not Estab. % Lymphocytes 28 Not Estab. % MONOCYTES 5 Not Estab. % EOSINOPHILS 1 Not Estab. % BASOPHILS 1 Not Estab. %  
 ABS. NEUTROPHILS 5.1 1.4 - 7.0 x10E3/uL Abs Lymphocytes 2.2 0.7 - 3.1 x10E3/uL  
 ABS. MONOCYTES 0.4 0.1 - 0.9 x10E3/uL  
 ABS. EOSINOPHILS 0.1 0.0 - 0.4 x10E3/uL  
 ABS. BASOPHILS 0.1 0.0 - 0.2 x10E3/uL IMMATURE GRANULOCYTES 1 Not Estab. %  
 ABS. IMM. GRANS. 0.0 0.0 - 0.1 x10E3/uL Narrative Performed at:  [address]  [id number] : Quyen Dawn MD, Phone:  [phone] METABOLIC PANEL, COMPREHENSIVE Result Value Ref Range Glucose 338 (H) 65 - 99 mg/dL BUN 9 6 - 20 mg/dL Creatinine 0.59 0.57 - 1.00 mg/dL  GFR est non- >59 mL/min/1.73  
 GFR est  >59 mL/min/1.73  
 BUN/Creatinine ratio 15 9 - 23 Sodium 134 134 - 144 mmol/L Potassium 4.3 3.5 - 5.2 mmol/L Chloride 96 96 - 106 mmol/L  
 CO2 18 (L) 20 - 29 mmol/L Calcium 9.5 8.7 - 10.2 mg/dL Protein, total 7.6 6.0 - 8.5 g/dL Albumin 4.1 3.9 - 5.0 g/dL GLOBULIN, TOTAL 3.5 1.5 - 4.5 g/dL A-G Ratio 1.2 1.2 - 2.2 Bilirubin, total 0.3 0.0 - 1.2 mg/dL Alk. phosphatase 96 39 - 117 IU/L  
 AST (SGOT) 30 0 - 40 IU/L  
 ALT (SGPT) 41 (H) 0 - 32 IU/L Narrative Performed at:  [address]  [id number] : Kevin Porras MD, Phone:  [phone] LIPID PANEL Result Value Ref Range Cholesterol, total 244 (H) 100 - 199 mg/dL Triglyceride 1,183 (HH) 0 - 149 mg/dL HDL Cholesterol 27 (L) >39 mg/dL VLDL, calculated Comment (A) 5 - 40 mg/dL LDL, calculated Comment (A) 0 - 99 mg/dL Narrative Performed at:  [address]  [id number] : Kevin Porras MD, Phone:  [phone] CVD REPORT Result Value Ref Range INTERPRETATION Note Narrative Performed at:  [address]  [id number] : Bernabe Wilkes MD, Phone:  [phone] DIABETES PATIENT EDUCATION Result Value Ref Range PDF Image Not applicable Narrative Performed at:  [address]  [id number] : Bernabe Wilkes MD, Phone:  [phone] TSH 3RD GENERATION Result Value Ref Range TSH 1.530 0.450 - 4.500 uIU/mL Narrative Performed at:  [address]  [id number] : Kevin Porras MD, Phone:  [phone] HEMOGLOBIN A1C W/O EAG Result Value Ref Range Hemoglobin A1c 10.8 (H) 4.8 - 5.6 % Narrative Performed at:  [address]  [id number] : Vidal Goddard MD, Phone:  [phone] RECOMMENDATIONS: 
 
Please schedule follow up for lab results  Uncontrolled DM Please call me if you have any questions: [phone] Sincerely, Jeffrey Cedillo

## 2021-04-05 LAB — SARS-COV-2, NAA: NOT DETECTED

## 2021-05-04 DIAGNOSIS — E11.9 TYPE 2 DIABETES MELLITUS WITHOUT COMPLICATION, WITHOUT LONG-TERM CURRENT USE OF INSULIN (HCC): ICD-10-CM

## 2021-05-04 DIAGNOSIS — E78.1 HYPERTRIGLYCERIDEMIA: ICD-10-CM

## 2021-05-06 RX ORDER — ATORVASTATIN CALCIUM 20 MG/1
TABLET, FILM COATED ORAL
Qty: 90 TAB | Refills: 0 | Status: SHIPPED | OUTPATIENT
Start: 2021-05-06 | End: 2022-06-06 | Stop reason: DRUGHIGH

## 2021-12-15 ENCOUNTER — TELEPHONE (OUTPATIENT)
Dept: FAMILY MEDICINE CLINIC | Age: 31
End: 2021-12-15

## 2022-03-18 PROBLEM — E11.9 TYPE 2 DIABETES MELLITUS WITHOUT COMPLICATION, WITHOUT LONG-TERM CURRENT USE OF INSULIN (HCC): Status: ACTIVE | Noted: 2019-05-30

## 2022-03-30 ENCOUNTER — PATIENT MESSAGE (OUTPATIENT)
Dept: FAMILY MEDICINE CLINIC | Age: 32
End: 2022-03-30

## 2022-03-30 DIAGNOSIS — E78.1 HYPERTRIGLYCERIDEMIA: ICD-10-CM

## 2022-03-30 DIAGNOSIS — E11.65 TYPE 2 DIABETES MELLITUS WITH HYPERGLYCEMIA, WITHOUT LONG-TERM CURRENT USE OF INSULIN (HCC): Primary | ICD-10-CM

## 2022-03-30 DIAGNOSIS — E66.9 OBESITY (BMI 30.0-34.9): ICD-10-CM

## 2022-03-30 NOTE — TELEPHONE ENCOUNTER
From: Dakota Guevara  To: Ngoc Mata MD  Sent: 3/30/2022 9:10 AM EDT  Subject: Prescription     Can you give me a new prescription for metformin?

## 2022-05-26 ENCOUNTER — TELEPHONE (OUTPATIENT)
Dept: FAMILY MEDICINE CLINIC | Age: 32
End: 2022-05-26

## 2022-05-26 DIAGNOSIS — E11.65 TYPE 2 DIABETES MELLITUS WITH HYPERGLYCEMIA, WITHOUT LONG-TERM CURRENT USE OF INSULIN (HCC): Primary | ICD-10-CM

## 2022-05-26 DIAGNOSIS — Z11.59 NEED FOR HEPATITIS C SCREENING TEST: ICD-10-CM

## 2022-06-01 ENCOUNTER — HOSPITAL ENCOUNTER (OUTPATIENT)
Dept: LAB | Age: 32
Discharge: HOME | End: 2022-06-01
Payer: COMMERCIAL

## 2022-06-01 ENCOUNTER — APPOINTMENT (OUTPATIENT)
Dept: FAMILY MEDICINE CLINIC | Age: 32
End: 2022-06-01

## 2022-06-01 DIAGNOSIS — E11.65 TYPE 2 DIABETES MELLITUS WITH HYPERGLYCEMIA, WITHOUT LONG-TERM CURRENT USE OF INSULIN (HCC): ICD-10-CM

## 2022-06-01 DIAGNOSIS — E66.9 OBESITY (BMI 30.0-34.9): ICD-10-CM

## 2022-06-01 DIAGNOSIS — Z11.59 NEED FOR HEPATITIS C SCREENING TEST: ICD-10-CM

## 2022-06-01 DIAGNOSIS — E78.1 HYPERTRIGLYCERIDEMIA: ICD-10-CM

## 2022-06-01 LAB
ALBUMIN SERPL-MCNC: 4 G/DL (ref 3.4–5)
ALBUMIN/GLOB SERPL: 1.1 {RATIO} (ref 0.8–1.7)
ALP SERPL-CCNC: 44 U/L (ref 45–117)
ALT SERPL-CCNC: 28 U/L (ref 13–56)
ANION GAP SERPL CALC-SCNC: 6 MMOL/L (ref 3–18)
AST SERPL-CCNC: 17 U/L (ref 10–38)
BASOPHILS # BLD: 0 K/UL (ref 0–0.1)
BASOPHILS NFR BLD: 1 % (ref 0–2)
BILIRUB SERPL-MCNC: 0.3 MG/DL (ref 0.2–1)
BUN SERPL-MCNC: 12 MG/DL (ref 7–18)
BUN/CREAT SERPL: 20 (ref 12–20)
CALCIUM SERPL-MCNC: 9.3 MG/DL (ref 8.5–10.1)
CHLORIDE SERPL-SCNC: 108 MMOL/L (ref 100–111)
CHOLEST SERPL-MCNC: 174 MG/DL
CO2 SERPL-SCNC: 25 MMOL/L (ref 21–32)
CREAT SERPL-MCNC: 0.59 MG/DL (ref 0.6–1.3)
DIFFERENTIAL METHOD BLD: NORMAL
EOSINOPHIL # BLD: 0.1 K/UL (ref 0–0.4)
EOSINOPHIL NFR BLD: 1 % (ref 0–5)
ERYTHROCYTE [DISTWIDTH] IN BLOOD BY AUTOMATED COUNT: 13.4 % (ref 11.6–14.5)
GLOBULIN SER CALC-MCNC: 3.7 G/DL (ref 2–4)
GLUCOSE SERPL-MCNC: 131 MG/DL (ref 74–99)
HBA1C MFR BLD: 8.4 % (ref 4.2–5.6)
HCT VFR BLD AUTO: 41.4 % (ref 35–45)
HDLC SERPL-MCNC: 47 MG/DL (ref 40–60)
HDLC SERPL: 3.7 {RATIO} (ref 0–5)
HGB BLD-MCNC: 12.9 G/DL (ref 12–16)
IMM GRANULOCYTES # BLD AUTO: 0 K/UL (ref 0–0.04)
IMM GRANULOCYTES NFR BLD AUTO: 0 % (ref 0–0.5)
LDLC SERPL CALC-MCNC: 97.8 MG/DL (ref 0–100)
LIPID PROFILE,FLP: NORMAL
LYMPHOCYTES # BLD: 2.6 K/UL (ref 0.9–3.6)
LYMPHOCYTES NFR BLD: 36 % (ref 21–52)
MCH RBC QN AUTO: 27 PG (ref 24–34)
MCHC RBC AUTO-ENTMCNC: 31.2 G/DL (ref 31–37)
MCV RBC AUTO: 86.6 FL (ref 78–100)
MONOCYTES # BLD: 0.5 K/UL (ref 0.05–1.2)
MONOCYTES NFR BLD: 7 % (ref 3–10)
NEUTS SEG # BLD: 4.1 K/UL (ref 1.8–8)
NEUTS SEG NFR BLD: 56 % (ref 40–73)
NRBC # BLD: 0 K/UL (ref 0–0.01)
NRBC BLD-RTO: 0 PER 100 WBC
PLATELET # BLD AUTO: 270 K/UL (ref 135–420)
PMV BLD AUTO: 9.7 FL (ref 9.2–11.8)
POTASSIUM SERPL-SCNC: 4.4 MMOL/L (ref 3.5–5.5)
PROT SERPL-MCNC: 7.7 G/DL (ref 6.4–8.2)
RBC # BLD AUTO: 4.78 M/UL (ref 4.2–5.3)
SODIUM SERPL-SCNC: 139 MMOL/L (ref 136–145)
TRIGL SERPL-MCNC: 146 MG/DL (ref ?–150)
VLDLC SERPL CALC-MCNC: 29.2 MG/DL
WBC # BLD AUTO: 7.2 K/UL (ref 4.6–13.2)

## 2022-06-01 PROCEDURE — 83036 HEMOGLOBIN GLYCOSYLATED A1C: CPT

## 2022-06-01 PROCEDURE — 80061 LIPID PANEL: CPT

## 2022-06-01 PROCEDURE — 80053 COMPREHEN METABOLIC PANEL: CPT

## 2022-06-01 PROCEDURE — 85025 COMPLETE CBC W/AUTO DIFF WBC: CPT

## 2022-06-01 PROCEDURE — 36415 COLL VENOUS BLD VENIPUNCTURE: CPT

## 2022-06-01 PROCEDURE — 86803 HEPATITIS C AB TEST: CPT

## 2022-06-03 LAB
HCV AB SER IA-ACNC: 0.07 INDEX
HCV AB SERPL QL IA: NEGATIVE
HCV COMMENT,HCGAC: NORMAL

## 2022-06-06 ENCOUNTER — OFFICE VISIT (OUTPATIENT)
Dept: FAMILY MEDICINE CLINIC | Age: 32
End: 2022-06-06
Payer: COMMERCIAL

## 2022-06-06 VITALS
OXYGEN SATURATION: 96 % | HEIGHT: 65 IN | TEMPERATURE: 97.6 F | BODY MASS INDEX: 31.32 KG/M2 | DIASTOLIC BLOOD PRESSURE: 66 MMHG | RESPIRATION RATE: 15 BRPM | SYSTOLIC BLOOD PRESSURE: 100 MMHG | WEIGHT: 188 LBS | HEART RATE: 107 BPM

## 2022-06-06 DIAGNOSIS — E11.9 TYPE 2 DIABETES MELLITUS WITHOUT COMPLICATION, WITHOUT LONG-TERM CURRENT USE OF INSULIN (HCC): ICD-10-CM

## 2022-06-06 DIAGNOSIS — E78.1 HYPERTRIGLYCERIDEMIA: ICD-10-CM

## 2022-06-06 DIAGNOSIS — Z00.00 ANNUAL PHYSICAL EXAM: Primary | ICD-10-CM

## 2022-06-06 PROCEDURE — 99395 PREV VISIT EST AGE 18-39: CPT | Performed by: LEGAL MEDICINE

## 2022-06-06 RX ORDER — METFORMIN HYDROCHLORIDE 1000 MG/1
1000 TABLET ORAL 2 TIMES DAILY WITH MEALS
COMMUNITY
Start: 2022-05-07 | End: 2022-06-06 | Stop reason: SDUPTHER

## 2022-06-06 RX ORDER — FENOFIBRATE 145 MG/1
145 TABLET, COATED ORAL
COMMUNITY
Start: 2022-05-11

## 2022-06-06 RX ORDER — GLIPIZIDE 5 MG/1
TABLET, FILM COATED, EXTENDED RELEASE ORAL
COMMUNITY
Start: 2022-05-07

## 2022-06-06 RX ORDER — METFORMIN HYDROCHLORIDE 1000 MG/1
1000 TABLET ORAL 2 TIMES DAILY WITH MEALS
Qty: 180 TABLET | Refills: 1 | Status: SHIPPED | OUTPATIENT
Start: 2022-06-06 | End: 2022-09-04

## 2022-06-06 RX ORDER — ATORVASTATIN CALCIUM 10 MG/1
10 TABLET, FILM COATED ORAL DAILY
Qty: 90 TABLET | Refills: 3 | Status: SHIPPED | OUTPATIENT
Start: 2022-06-06 | End: 2022-09-04

## 2022-06-06 RX ORDER — SODIUM FLUORIDE 6 MG/ML
PASTE, DENTIFRICE DENTAL
COMMUNITY
Start: 2022-05-24

## 2022-06-06 NOTE — PROGRESS NOTES
Velma Morrissey is a 28 y.o. female (: 1990) presenting to address:    Chief Complaint   Patient presents with    Physical       Vitals:    22 1402   BP: 100/66   Pulse: (!) 107   Resp: 15   Temp: 97.6 °F (36.4 °C)   TempSrc: Temporal   SpO2: 96%   Weight: 188 lb (85.3 kg)   Height: 5' 4.72\" (1.644 m)   PainSc:   0 - No pain   LMP: 2022       Hearing/Vision:   No exam data present    Learning Assessment:     Learning Assessment 2019   PRIMARY LEARNER Patient   HIGHEST LEVEL OF EDUCATION - PRIMARY LEARNER  GRADUATED HIGH SCHOOL OR GED   BARRIERS PRIMARY LEARNER NONE   CO-LEARNER CAREGIVER No   PRIMARY LANGUAGE ENGLISH    NEED No   LEARNER PREFERENCE PRIMARY DEMONSTRATION   ANSWERED BY patient   RELATIONSHIP SELF     Depression Screening:     3 most recent PHQ Screens 2022   Little interest or pleasure in doing things Not at all   Feeling down, depressed, irritable, or hopeless Not at all   Total Score PHQ 2 0     Fall Risk Assessment:   No flowsheet data found. Abuse Screening:     Abuse Screening Questionnaire 2022   Do you ever feel afraid of your partner? N   Are you in a relationship with someone who physically or mentally threatens you? N   Is it safe for you to go home? Y     ADL Assessment:   No flowsheet data found. Coordination of Care Questionaire:   1. \"Have you been to the ER, urgent care clinic since your last visit? Hospitalized since your last visit? \" No    2. \"Have you seen or consulted any other health care providers outside of the [address] [place] [address] since your last visit? \" No     3. For patients aged 39-70: Has the patient had a colonoscopy? NA - based on age     If the patient is female:    4. For patients aged 41-77: Has the patient had a mammogram within the past 2 years? NA - based on age    11. For patients aged 21-65: Has the patient had a pap smear? Yes - Care Gap present. Most recent result on file    Advanced Directive:   1.  Do you have an Advanced Directive? NO    2. Would you like information on Advanced Directives?  NO

## 2022-06-06 NOTE — PROGRESS NOTES
[facility]     Chief Complaint   Patient presents with    Physical     Vitals:    06/06/22 1402   BP: 100/66   Pulse: (!) 107   Resp: 15   Temp: 97.6 °F (36.4 °C)   TempSrc: Temporal   SpO2: 96%   Weight: 188 lb (85.3 kg)   Height: 5' 4.72\" (1.644 m)   PainSc:   0 - No pain   LMP: 06/02/2022         HPI: Mrs. Shaquille Santa  is here for annual physical examination     She does not smoke does not drink alcohol no illicit drugs     Patient does have diabetes that is not controlled she is currently only on Metformin and glipizide XL has been added at urgent care recently, she has been out of metformin and she has not taken medications since January for about 5 months      She changed her eating by decreasing carbohydrates and sugar and she is also walking with the dog           Past Medical History:   Diagnosis Date    GERD (gastroesophageal reflux disease)     Type 2 diabetes mellitus without complication, without long-term current use of insulin ([facility] [address] 5/30/2019     No past surgical history on file. Social History     Tobacco Use    Smoking status: Never Smoker    Smokeless tobacco: Never Used   Substance Use Topics    Alcohol use: Not Currently     Comment: patient may be pregnant       No family history on file. Review of Systems   Constitutional: Negative for chills, fever, malaise/fatigue and weight loss. HENT: Negative for congestion, ear discharge, ear pain, hearing loss, nosebleeds, sinus pain and sore throat. Eyes: Negative for blurred vision, double vision and discharge. Respiratory: Negative for cough, hemoptysis, sputum production, shortness of breath and stridor. Cardiovascular: Negative for chest pain, palpitations, claudication and leg swelling. Gastrointestinal: Negative for abdominal pain, blood in stool, constipation, diarrhea, melena, nausea and vomiting. Genitourinary: Negative for dysuria, flank pain, frequency, hematuria and urgency.    Musculoskeletal: Negative for back pain, joint pain, myalgias and neck pain. Skin: Negative for itching and rash. Neurological: Negative for dizziness, tingling, sensory change, speech change, focal weakness, weakness and headaches. Psychiatric/Behavioral: Negative for depression, hallucinations, substance abuse and suicidal ideas. The patient is not nervous/anxious and does not have insomnia. Physical Exam  Vitals and nursing note reviewed. Constitutional:       General: She is not in acute distress. Appearance: She is well-developed. She is not diaphoretic. HENT:      Head: Normocephalic and atraumatic. Right Ear: Tympanic membrane, ear canal and external ear normal.      Left Ear: Tympanic membrane, ear canal and external ear normal.      Mouth/Throat:      Pharynx: No posterior oropharyngeal erythema. Eyes:      General: No scleral icterus. Right eye: No discharge. Left eye: No discharge. Conjunctiva/sclera: Conjunctivae normal.      Pupils: Pupils are equal, round, and reactive to light. Neck:      Thyroid: No thyromegaly. Cardiovascular:      Rate and Rhythm: Normal rate and regular rhythm. Heart sounds: Normal heart sounds. Pulmonary:      Effort: Pulmonary effort is normal. No respiratory distress. Breath sounds: Normal breath sounds. No wheezing, rhonchi or rales. Chest:      Chest wall: No tenderness. Abdominal:      General: There is no distension. Palpations: Abdomen is soft. Tenderness: There is no abdominal tenderness. There is no right CVA tenderness, left CVA tenderness, guarding or rebound. Musculoskeletal:         General: No tenderness or deformity. Normal range of motion. Right lower leg: No edema. Left lower leg: No edema. Lymphadenopathy:      Cervical: No cervical adenopathy. Skin:     General: Skin is warm and dry. Coloration: Skin is not jaundiced or pale. Findings: No bruising, erythema, lesion or rash. Neurological:      General: No focal deficit present. Mental Status: She is alert and oriented to person, place, and time. Cranial Nerves: No cranial nerve deficit. Sensory: No sensory deficit. Coordination: Coordination normal.      Gait: Gait normal.      Deep Tendon Reflexes: Reflexes normal.   Psychiatric:         Behavior: Behavior normal.         Thought Content: Thought content normal.         Judgment: Judgment normal.          Assessment and plan     Plan of care has been discussed with the patient, he agrees to the plan and verbalized understanding. All his questions were answered  More than 50% of the time spent in this visit was counseling the patient about  illness and treatment options         1. Annual physical exam  Lab result discussed with patient improvement and lipid panel and she has been off medication for the last 6 months Tricor and atorvastatin    Improved hemoglobin A1c is 8.4    2. Type 2 diabetes mellitus without complication, without long-term current use of insulin ([facility])  Current hemoglobin A1c is 8.4 consider stopping glipizide and starting patient on semaglutide  - semaglutide (Rybelsus) 3 mg tablet; Take 1 Tablet by mouth Daily (before breakfast) for 30 days. Dispense: 30 Tablet; Refill: 1  - metFORMIN (GLUCOPHAGE) 1,000 mg tablet; Take 1 Tablet by mouth two (2) times daily (with meals) for 90 days. Dispense: 180 Tablet; Refill: 1  - [facility] GLUCOSE FLOWSHEET    3. Hypertriglyceridemia  Patient has not been taking any cholesterol medication neither Tricor  Her triglyceride is normal  To reduce Lipitor dose to 10 mg  - atorvastatin (LIPITOR) 10 mg tablet; Take 1 Tablet by mouth daily for 90 days. Dispense: 90 Tablet;  Refill: 3    Current Outpatient Medications   Medication Sig Dispense Refill    glipiZIDE SR (GLUCOTROL XL) 5 mg CR tablet TAKE 1 TABLET BY MOUTH EVERY DAY WITH BREAKFAST      fenofibrate nanocrystallized (TRICOR) 145 mg tablet Take 145 mg by mouth nightly.  fluoride, sodium, 1.1 % pste BRUSH ON TEETH TWICE A DAY      semaglutide (Rybelsus) 3 mg tablet Take 1 Tablet by mouth Daily (before breakfast) for 30 days. 30 Tablet 1    metFORMIN (GLUCOPHAGE) 1,000 mg tablet Take 1 Tablet by mouth two (2) times daily (with meals) for 90 days. 180 Tablet 1    atorvastatin (LIPITOR) 10 mg tablet Take 1 Tablet by mouth daily for 90 days. 90 Tablet 3    lancets misc Use once lancet each time for  Once daily glucose testing. Insurance choice Dx 11.9 100 Each 2    glucose blood VI test strips (FREESTYLE LITE STRIPS) strip Use one test strip each time for once daily glucose testing. Dx E 11.9  Insurance choice 100 Strip 2    multivitamin (ONE A DAY) tablet Take 1 Tab by mouth daily.  Blood-Glucose Meter monitoring kit Insurance choice dx E11.9 1 Kit 0       Patient Active Problem List    Diagnosis Date Noted    Type 2 diabetes mellitus without complication, without long-term current use of insulin ([facility] 05/30/2019     Results for orders placed or performed during the hospital encounter of 06/01/22   CBC WITH AUTOMATED DIFF   Result Value Ref Range    WBC 7.2 4.6 - 13.2 K/uL    RBC 4.78 4.20 - 5.30 M/uL    HGB 12.9 12.0 - 16.0 g/dL    HCT 41.4 35.0 - 45.0 %    MCV 86.6 78.0 - 100.0 FL    MCH 27.0 24.0 - 34.0 PG    MCHC 31.2 31.0 - 37.0 g/dL    RDW 13.4 11.6 - 14.5 %    PLATELET 614 780 - 136 K/uL    MPV 9.7 9.2 - 11.8 FL    NRBC 0.0 0  WBC    ABSOLUTE NRBC 0.00 0.00 - 0.01 K/uL    NEUTROPHILS 56 40 - 73 %    LYMPHOCYTES 36 21 - 52 %    MONOCYTES 7 3 - 10 %    EOSINOPHILS 1 0 - 5 %    BASOPHILS 1 0 - 2 %    IMMATURE GRANULOCYTES 0 0.0 - 0.5 %    ABS. NEUTROPHILS 4.1 1.8 - 8.0 K/UL    ABS. LYMPHOCYTES 2.6 0.9 - 3.6 K/UL    ABS. MONOCYTES 0.5 0.05 - 1.2 K/UL    ABS. EOSINOPHILS 0.1 0.0 - 0.4 K/UL    ABS. BASOPHILS 0.0 0.0 - 0.1 K/UL    ABS. IMM.  GRANS. 0.0 0.00 - 0.04 K/UL    DF AUTOMATED     METABOLIC PANEL, COMPREHENSIVE   Result Value Ref Range    Sodium 139 136 - 145 mmol/L    Potassium 4.4 3.5 - 5.5 mmol/L    Chloride 108 100 - 111 mmol/L    CO2 25 21 - 32 mmol/L    Anion gap 6 3.0 - 18 mmol/L    Glucose 131 (H) 74 - 99 mg/dL    BUN 12 7.0 - 18 MG/DL    Creatinine 0.59 (L) 0.6 - 1.3 MG/DL    BUN/Creatinine ratio 20 12 - 20      GFR est AA >60 >60 ml/min/1.73m2    GFR est non-AA >60 >60 ml/min/1.73m2    Calcium 9.3 8.5 - 10.1 MG/DL    Bilirubin, total 0.3 0.2 - 1.0 MG/DL    ALT (SGPT) 28 13 - 56 U/L    AST (SGOT) 17 10 - 38 U/L    Alk. phosphatase 44 (L) 45 - 117 U/L    Protein, total 7.7 6.4 - 8.2 g/dL    Albumin 4.0 3.4 - 5.0 g/dL    Globulin 3.7 2.0 - 4.0 g/dL    A-G Ratio 1.1 0.8 - 1.7     LIPID PANEL   Result Value Ref Range    LIPID PROFILE          Cholesterol, total 174 <200 MG/DL    Triglyceride 146 <150 MG/DL    HDL Cholesterol 47 40 - 60 MG/DL    LDL, calculated 97.8 0 - 100 MG/DL    VLDL, calculated 29.2 MG/DL    CHOL/HDL Ratio 3.7 0 - 5.0     HEMOGLOBIN A1C W/O EAG   Result Value Ref Range    Hemoglobin A1c 8.4 (H) 4.2 - 5.6 %   HEPATITIS C AB   Result Value Ref Range    Hepatitis C virus Ab 0.07 <0.80 Index    Hep C virus Ab Interp.  Negative NEG      Hep C  virus Ab comment           Hospital Outpatient Visit on 06/01/2022   Component Date Value Ref Range Status    WBC 06/01/2022 7.2  4.6 - 13.2 K/uL Final    RBC 06/01/2022 4.78  4.20 - 5.30 M/uL Final    HGB 06/01/2022 12.9  12.0 - 16.0 g/dL Final    HCT 06/01/2022 41.4  35.0 - 45.0 % Final    MCV 06/01/2022 86.6  78.0 - 100.0 FL Final    MCH 06/01/2022 27.0  24.0 - 34.0 PG Final    MCHC 06/01/2022 31.2  31.0 - 37.0 g/dL Final    RDW 06/01/2022 13.4  11.6 - 14.5 % Final    PLATELET 61/32/7999 459  135 - 420 K/uL Final    MPV 06/01/2022 9.7  9.2 - 11.8 FL Final    NRBC 06/01/2022 0.0  0  WBC Final    ABSOLUTE NRBC 06/01/2022 0.00  0.00 - 0.01 K/uL Final    NEUTROPHILS 06/01/2022 56  40 - 73 % Final    LYMPHOCYTES 06/01/2022 36  21 - 52 % Final    MONOCYTES 06/01/2022 7  3 - 10 % Final    EOSINOPHILS 06/01/2022 1  0 - 5 % Final    BASOPHILS 06/01/2022 1  0 - 2 % Final    IMMATURE GRANULOCYTES 06/01/2022 0  0.0 - 0.5 % Final    ABS. NEUTROPHILS 06/01/2022 4.1  1.8 - 8.0 K/UL Final    ABS. LYMPHOCYTES 06/01/2022 2.6  0.9 - 3.6 K/UL Final    ABS. MONOCYTES 06/01/2022 0.5  0.05 - 1.2 K/UL Final    ABS. EOSINOPHILS 06/01/2022 0.1  0.0 - 0.4 K/UL Final    ABS. BASOPHILS 06/01/2022 0.0  0.0 - 0.1 K/UL Final    ABS. IMM. GRANS. 06/01/2022 0.0  0.00 - 0.04 K/UL Final    DF 06/01/2022 AUTOMATED    Final    Sodium 06/01/2022 139  136 - 145 mmol/L Final    Potassium 06/01/2022 4.4  3.5 - 5.5 mmol/L Final    Chloride 06/01/2022 108  100 - 111 mmol/L Final    CO2 06/01/2022 25  21 - 32 mmol/L Final    Anion gap 06/01/2022 6  3.0 - 18 mmol/L Final    Glucose 06/01/2022 131* 74 - 99 mg/dL Final    BUN 06/01/2022 12  7.0 - 18 MG/DL Final    Creatinine 06/01/2022 0.59* 0.6 - 1.3 MG/DL Final    BUN/Creatinine ratio 06/01/2022 20  12 - 20   Final    GFR est AA 06/01/2022 >60  >60 ml/min/1.73m2 Final    GFR est non-AA 06/01/2022 >60  >60 ml/min/1.73m2 Final    Comment: (NOTE)  Estimated GFR is calculated using the Modification of Diet in Renal   Disease (MDRD) Study equation, reported for both  Americans   (GFRAA) and non- Americans (GFRNA), and normalized to 1.73m2   body surface area. The physician must decide which value applies to   the patient. The MDRD study equation should only be used in   individuals age 25 or older. It has not been validated for the   following: pregnant women, patients with serious comorbid conditions,   or on certain medications, or persons with extremes of body size,   muscle mass, or nutritional status.  Calcium 06/01/2022 9.3  8.5 - 10.1 MG/DL Final    Bilirubin, total 06/01/2022 0.3  0.2 - 1.0 MG/DL Final    ALT (SGPT) 06/01/2022 28  13 - 56 U/L Final    AST (SGOT) 06/01/2022 17  10 - 38 U/L Final    Alk. phosphatase 06/01/2022 44* 45 - 117 U/L Final    Protein, total 06/01/2022 7.7  6.4 - 8.2 g/dL Final    Albumin 06/01/2022 4.0  3.4 - 5.0 g/dL Final    Globulin 06/01/2022 3.7  2.0 - 4.0 g/dL Final    A-G Ratio 06/01/2022 1.1  0.8 - 1.7   Final    LIPID PROFILE 06/01/2022        Final    Cholesterol, total 06/01/2022 174  <200 MG/DL Final    Triglyceride 06/01/2022 146  <150 MG/DL Final    Comment: The drugs N-acetylcysteine (NAC) and  Metamiszole have been found to cause falsely  low results in this chemical assay. Please  be sure to submit blood samples obtained  BEFORE administration of either of these  drugs to assure correct results.  HDL Cholesterol 06/01/2022 47  40 - 60 MG/DL Final    LDL, calculated 06/01/2022 97.8  0 - 100 MG/DL Final    VLDL, calculated 06/01/2022 29.2  MG/DL Final    CHOL/HDL Ratio 06/01/2022 3.7  0 - 5.0   Final    Hemoglobin A1c 06/01/2022 8.4* 4.2 - 5.6 % Final    Comment: (NOTE)  HbA1C Interpretive Ranges  <5.7              Normal  5.7 - 6.4         Consider Prediabetes  >6.5              Consider Diabetes      Hepatitis C virus Ab 06/01/2022 0.07  <0.80 Index Final    Hep C virus Ab Interp. 06/01/2022 Negative  NEG   Final    Hep C  virus Ab comment 06/01/2022        Final    Comment: Index <0.80. ......................... Cari Maid Negative  Index > or = to 0.80 and <1.00. .... Cari Maid Cari Maid Equivocal  Index >1.00. ......................... Cari Maid Positive          For Equivocal or Positive results, confirmation with Hepatitis C RNA by PCR or bDNA is suggested. Follow-up and Dispositions    · Return in about 3 months (around 9/6/2022) for follow up.

## 2022-08-22 DIAGNOSIS — E11.9 TYPE 2 DIABETES MELLITUS WITHOUT COMPLICATION, WITHOUT LONG-TERM CURRENT USE OF INSULIN (HCC): ICD-10-CM

## 2022-08-28 DIAGNOSIS — E78.1 HYPERTRIGLYCERIDEMIA: ICD-10-CM

## 2022-08-28 RX ORDER — ATORVASTATIN CALCIUM 10 MG/1
10 TABLET, FILM COATED ORAL DAILY
Qty: 90 TABLET | Refills: 3 | Status: CANCELLED | OUTPATIENT
Start: 2022-08-28 | End: 2022-11-26

## 2022-08-28 RX ORDER — ORAL SEMAGLUTIDE 3 MG/1
TABLET ORAL
Qty: 30 TABLET | Refills: 1 | Status: SHIPPED | OUTPATIENT
Start: 2022-08-28

## 2022-11-29 ENCOUNTER — TELEPHONE (OUTPATIENT)
Dept: FAMILY MEDICINE CLINIC | Age: 32
End: 2022-11-29

## 2022-11-29 NOTE — TELEPHONE ENCOUNTER
Pt came in at 9:51am for a 9:45am 15 min appt. I did let her know that she was past her 5 minute juancarlos period and might have to r/s. I spoke to Mansi Ramírez about if the patient could be seen which she said she would have to r/s. I apologized to the patient and informed her that she would have to r/s and offered to r/s her appt. Pt states she would have to call back.

## 2023-04-01 DIAGNOSIS — E11.9 TYPE 2 DIABETES MELLITUS WITHOUT COMPLICATIONS (HCC): ICD-10-CM

## 2023-04-04 NOTE — TELEPHONE ENCOUNTER
Patient has not been seen since 6/22  Please schedule appointment and need to get labs done let me know when she schedule to order  labs

## 2023-11-13 DIAGNOSIS — E11.9 TYPE 2 DIABETES MELLITUS WITHOUT COMPLICATIONS (HCC): ICD-10-CM
